# Patient Record
Sex: FEMALE | Race: OTHER | NOT HISPANIC OR LATINO
[De-identification: names, ages, dates, MRNs, and addresses within clinical notes are randomized per-mention and may not be internally consistent; named-entity substitution may affect disease eponyms.]

---

## 2019-06-13 ENCOUNTER — RESULT REVIEW (OUTPATIENT)
Age: 44
End: 2019-06-13

## 2022-02-13 ENCOUNTER — EMERGENCY (EMERGENCY)
Facility: HOSPITAL | Age: 47
LOS: 1 days | Discharge: HOME | End: 2022-02-13
Attending: EMERGENCY MEDICINE
Payer: COMMERCIAL

## 2022-02-13 VITALS
SYSTOLIC BLOOD PRESSURE: 113 MMHG | OXYGEN SATURATION: 97 % | WEIGHT: 136.69 LBS | DIASTOLIC BLOOD PRESSURE: 72 MMHG | HEIGHT: 62 IN | TEMPERATURE: 98 F | HEART RATE: 74 BPM | RESPIRATION RATE: 16 BRPM

## 2022-02-13 DIAGNOSIS — Z98.89 OTHER SPECIFIED POSTPROCEDURAL STATES: Chronic | ICD-10-CM

## 2022-02-13 PROCEDURE — 73562 X-RAY EXAM OF KNEE 3: CPT

## 2022-02-13 PROCEDURE — 99284 EMERGENCY DEPT VISIT MOD MDM: CPT

## 2022-02-13 PROCEDURE — 99284 EMERGENCY DEPT VISIT MOD MDM: CPT | Mod: 25

## 2022-02-13 PROCEDURE — 93971 EXTREMITY STUDY: CPT

## 2022-02-13 PROCEDURE — 93971 EXTREMITY STUDY: CPT | Mod: 26,LT

## 2022-02-13 PROCEDURE — 73562 X-RAY EXAM OF KNEE 3: CPT | Mod: 26,LT

## 2022-02-13 RX ORDER — IBUPROFEN 200 MG
600 TABLET ORAL ONCE
Refills: 0 | Status: COMPLETED | OUTPATIENT
Start: 2022-02-13 | End: 2022-02-13

## 2022-02-13 RX ADMIN — Medication 600 MILLIGRAM(S): at 16:45

## 2022-02-13 NOTE — ED PROVIDER NOTE - NSFOLLOWUPINSTRUCTIONS_ED_ALL_ED_FT
Your xray did not show a fracture/broke bone. Your ultrasound was negative for blood clot/DVT.     Follow up with Orthopedic doctor within 1 week.     You can take motrin/tylenol as needed for pain.    If you experience any new or worsening symptoms or if you are concerned you can always come back to the emergency for a re-evaluation.

## 2022-02-13 NOTE — ED PROVIDER NOTE - ATTENDING CONTRIBUTION TO CARE
seen with acp  c/o left knee pain and leg pain  PE no tenderness over left knee  xrays and doppler are negative  agree with acps assessment hx and physical and disposition

## 2022-02-13 NOTE — ED PROVIDER NOTE - PATIENT PORTAL LINK FT
You can access the FollowMyHealth Patient Portal offered by [facility] by registering at the following website: [email or web address]. By joining [facility]’s FollowMyHealth portal, you will also be able to view your health information using other applications (apps) compatible with our system.

## 2022-02-13 NOTE — ED PROVIDER NOTE - NSFOLLOWUPCLINICS_GEN_ALL_ED_FT
[facility]  Orthopedics  [address] [place] [address]  Phone: [phone]  Fax: [phone]

## 2022-02-13 NOTE — ED PROVIDER NOTE - PHYSICAL EXAMINATION
Left knee: No discoloration, erythema, ecchymosis, deformity. Popliteal, dorsalis pedis and posterior tibial pulses equally strong 2+ bilaterally. Capillary refill in lower extremity < 2 seconds. Full range of motion during flexion, extension and hyperextension. No evidence of locked knee.  No patellar, femur or tibia tenderness on palpation. No crepitus. Able to bear weight. No joint laxity during varus and valgus stress test. Swelling proximal to the knee, worse when standing. Tenderness behind the knee.

## 2022-02-13 NOTE — ED PROVIDER NOTE - MUSCULOSKELETAL, MLM
Swelling proximal to the knee, worse when standing. Tenderness behind the knee. Full range of motion. No erythema.

## 2022-02-13 NOTE — ED PROVIDER NOTE - CLINICAL SUMMARY MEDICAL DECISION MAKING FREE TEXT BOX
46 year old female with non-traumatic left knee pain and swelling. Will obtain x-ray and ultrasound to rule out DVT. Will give pain medication.

## 2022-02-13 NOTE — ED PROVIDER NOTE - OBJECTIVE STATEMENT
46 year old female with a PHMx of hypothyroidism with a thyroidectomy presents to the ED with complaints of left knee pain and swelling since Tuesday, 2/8/22. Patient denies any injuries, numbness or tingling in the extremities.   NKDA. 46 year old female with a PHMx of hyperthyroidism with a thyroidectomy presents to the ED with complaints of left knee pain and swelling since Tuesday, 2/8/22. Patient denies any injuries, numbness or tingling in the extremities. Has not taken any medication for pain.  NKDA.

## 2022-02-17 PROBLEM — E03.9 HYPOTHYROIDISM, UNSPECIFIED: Chronic | Status: ACTIVE | Noted: 2022-02-13

## 2022-02-17 PROBLEM — Z00.00 ENCOUNTER FOR PREVENTIVE HEALTH EXAMINATION: Status: ACTIVE | Noted: 2022-02-17

## 2022-02-25 ENCOUNTER — APPOINTMENT (OUTPATIENT)
Dept: ORTHOPEDIC SURGERY | Facility: CLINIC | Age: 47
End: 2022-02-25

## 2022-03-01 ENCOUNTER — APPOINTMENT (OUTPATIENT)
Dept: ORTHOPEDIC SURGERY | Facility: CLINIC | Age: 47
End: 2022-03-01
Payer: COMMERCIAL

## 2022-03-01 ENCOUNTER — NON-APPOINTMENT (OUTPATIENT)
Age: 47
End: 2022-03-01

## 2022-03-01 VITALS — WEIGHT: 130 LBS | BODY MASS INDEX: 23.92 KG/M2 | HEIGHT: 62 IN

## 2022-03-01 DIAGNOSIS — M25.462 EFFUSION, LEFT KNEE: ICD-10-CM

## 2022-03-01 PROCEDURE — 99203 OFFICE O/P NEW LOW 30 MIN: CPT

## 2022-10-07 ENCOUNTER — APPOINTMENT (OUTPATIENT)
Dept: ORTHOPEDIC SURGERY | Facility: CLINIC | Age: 47
End: 2022-10-07

## 2022-10-18 ENCOUNTER — NON-APPOINTMENT (OUTPATIENT)
Age: 47
End: 2022-10-18

## 2022-10-18 ENCOUNTER — APPOINTMENT (OUTPATIENT)
Dept: ORTHOPEDIC SURGERY | Facility: CLINIC | Age: 47
End: 2022-10-18

## 2022-10-18 PROCEDURE — 20610 DRAIN/INJ JOINT/BURSA W/O US: CPT

## 2022-10-18 PROCEDURE — 73564 X-RAY EXAM KNEE 4 OR MORE: CPT | Mod: 50

## 2023-06-13 ENCOUNTER — APPOINTMENT (OUTPATIENT)
Dept: ORTHOPEDIC SURGERY | Facility: CLINIC | Age: 48
End: 2023-06-13
Payer: COMMERCIAL

## 2023-06-13 VITALS — WEIGHT: 130 LBS | HEIGHT: 62 IN | BODY MASS INDEX: 23.92 KG/M2

## 2023-06-13 PROCEDURE — 99212 OFFICE O/P EST SF 10 MIN: CPT

## 2023-06-13 RX ORDER — HYLAN G-F 20 16MG/2ML
48 SYRINGE (ML) INTRAARTICULAR
Qty: 2 | Refills: 0 | Status: ACTIVE | COMMUNITY
Start: 2023-06-13

## 2023-07-11 ENCOUNTER — APPOINTMENT (OUTPATIENT)
Dept: ORTHOPEDIC SURGERY | Facility: CLINIC | Age: 48
End: 2023-07-11
Payer: COMMERCIAL

## 2023-07-11 DIAGNOSIS — M25.562 PAIN IN RIGHT KNEE: ICD-10-CM

## 2023-07-11 DIAGNOSIS — M25.561 PAIN IN RIGHT KNEE: ICD-10-CM

## 2023-07-11 PROCEDURE — 20610 DRAIN/INJ JOINT/BURSA W/O US: CPT | Mod: 50

## 2023-12-06 NOTE — ED PROVIDER NOTE - NS ED MD DISPO DISCHARGE CCDA
Using  spoke with son regarding the results.    Patient/Caregiver provided printed discharge information.

## 2024-03-12 ENCOUNTER — APPOINTMENT (OUTPATIENT)
Dept: ORTHOPEDIC SURGERY | Facility: CLINIC | Age: 49
End: 2024-03-12

## 2024-03-20 ENCOUNTER — APPOINTMENT (OUTPATIENT)
Dept: ORTHOPEDIC SURGERY | Facility: CLINIC | Age: 49
End: 2024-03-20
Payer: COMMERCIAL

## 2024-03-20 VITALS — HEIGHT: 61 IN | WEIGHT: 132 LBS | BODY MASS INDEX: 24.92 KG/M2

## 2024-03-20 DIAGNOSIS — M17.0 BILATERAL PRIMARY OSTEOARTHRITIS OF KNEE: ICD-10-CM

## 2024-03-20 PROCEDURE — 73564 X-RAY EXAM KNEE 4 OR MORE: CPT | Mod: 50

## 2024-03-20 PROCEDURE — 20610 DRAIN/INJ JOINT/BURSA W/O US: CPT | Mod: 50

## 2024-03-20 PROCEDURE — 99214 OFFICE O/P EST MOD 30 MIN: CPT | Mod: 25

## 2024-03-20 RX ORDER — HYALURONATE SODIUM 20 MG/2 ML
20 SYRINGE (ML) INTRAARTICULAR
Qty: 6 | Refills: 0 | Status: ACTIVE | COMMUNITY
Start: 2024-03-20

## 2024-03-20 NOTE — DISCUSSION/SUMMARY
[de-identified] : 48-year-old female with chronic bilateral knee pain secondary to advanced degenerative arthritis. I discussed the treatment of degenerative arthritis with the patient at length today. I described the spectrum of treatment from nonoperative modalities to total joint arthroplasty. Noninvasive and nonoperative treatment modalities include weight reduction, activity modification with low impact exercise, PRN use of acetaminophen or anti-inflammatory medication if tolerated, glucosamine/chondroitin supplements, and physical therapy. Further treatments can include corticosteroid injection and the use of hyaluronic acid injections. Definitive treatment can certainly include total joint arthroplasty also. The risks and benefits of each treatment option was discussed and all questions were answered.  Bilateral knee cortisone injection performed today.  Will obtain authorization for Euflexxa injections.

## 2024-03-20 NOTE — PROCEDURE
[de-identified] : Injection: Right knee joint. Indication: Arthritis  A discussion was had with the patient regarding this procedure and all questions were answered. All risks, benefits and alternatives were discussed. These included but were not limited to bleeding, infection, and allergic reaction. Alcohol was used to clean the skin, and betadine was used to sterilize and prep the area in the lateral aspect of the right knee. Ethyl chloride spray was then used as a topical anesthetic. A 22-gauge needle was used to inject 4cc of 2% lidocaine and 1cc of 40mg Kenalog into the knee. A sterile bandage was then applied. The patient tolerated the procedure well and there were no complications.  Injection: Left knee joint. Indication: Arthritis  A discussion was had with the patient regarding this procedure and all questions were answered. All risks, benefits and alternatives were discussed. These included but were not limited to bleeding, infection, and allergic reaction. Alcohol was used to clean the skin, and betadine was used to sterilize and prep the area in the lateral aspect of the left knee. Ethyl chloride spray was then used as a topical anesthetic. A 22-gauge needle was used to inject 4cc of 2% lidocaine and 1cc of 40mg Kenalog into the knee. A sterile bandage was then applied. The patient tolerated the procedure well and there were no complications.

## 2024-03-20 NOTE — HISTORY OF PRESENT ILLNESS
[de-identified] : 48 female with chronic bilateral knee pain for 5 years, left worse then right. Pain is severe and substantially limits activities of daily living. Walking tolerance is reduced. Medication including NSAIDs and activity modification have been minimally effective for a period lasting greater than three months in duration.  She experiences swelling, clicking, and pain along the medial aspect.  Prior treatment has included physical therapy, home exercises, Tylenol, and NSAIDs, which have all provided mild relief.  Prior cortisone injections provided mild relief.  Prior Synvisc injection did not provide relief and she reports an adverse reaction with general rash.  The patient denies any radiation of the pain to the feet and it is not associated with numbness, tingling, or weakness.

## 2024-03-20 NOTE — PHYSICAL EXAM
[de-identified] : General: No acute distress Mental: Alert and oriented x3 Eyes: Conjunctivitis not seen Chest: Symmetric chest rise, no audible wheezing Skin: Bilateral lower extremities absent from rashes and ulcers Abdomen: No distention  Right knee: Skin: Clean, dry, intact  Inspection: No obvious malalignment, no masses, no swelling, no effusion.  Tenderness: positive MJLT. no LJLT. No tenderness over the medial and lateral patella facets. No ttp medial/lateral epicondyle, patella tendon, tibial tubercle, pes anserinus, or proximal fibula.  ROM: 0 to 130  Stability: Stable to varus and valgus, negative lachman. Negative anterior/posterior drawer.  Additional tests: Negative McMurrays test, negative Steinmann, Negative patellar grind test.   Strength: 4/5 quad, 5/5 H/TA/GS/EHL, no atrophy  Neuro: Sensation intact to light touch throughout in dp/sp/tib/[place]/saph nerve distributions Pulses: 2+ DP/PT pulses.  Left knee: Skin: Clean, dry, intact  Inspection: No obvious malalignment, no masses, no swelling, no effusion.  Tenderness: positive MJLT. no LJLT. No tenderness over the medial and lateral patella facets. No ttp medial/lateral epicondyle, patella tendon, tibial tubercle, pes anserinus, or proximal fibula.  ROM: 0 to 130  Stability: Stable to varus and valgus, negative lachman. Negative anterior/posterior drawer.  Additional tests: Negative McMurrays test, negative Steinmann, Negative patellar grind test.   Strength: 4/5 quad, 5/5 H/TA/GS/EHL, no atrophy  Neuro: Sensation intact to light touch throughout in dp/sp/tib/[place]/saph nerve distributions Pulses: 2+ DP/PT pulses. [Normal] : Gait: normal [de-identified] : X-ray of the right knee shows neutral alignment, tricompartmental arthritis with severe narrowing of the medial and lateral joint space, subchondral sclerosis, osteophytes, patellofemoral narrowing.  Kellgren-Tony 3  X-ray of the left knee shows neutral alignment, tricompartmental arthritis with severe narrowing of the medial and lateral joint space, subchondral sclerosis, osteophytes, patellofemoral narrowing.  Kellgren-Tony 3

## 2024-03-21 ENCOUNTER — APPOINTMENT (OUTPATIENT)
Dept: ORTHOPEDIC SURGERY | Facility: CLINIC | Age: 49
End: 2024-03-21

## 2024-05-06 ENCOUNTER — APPOINTMENT (OUTPATIENT)
Dept: ORTHOPEDIC SURGERY | Facility: CLINIC | Age: 49
End: 2024-05-06